# Patient Record
Sex: FEMALE | Race: WHITE | NOT HISPANIC OR LATINO | Employment: OTHER | URBAN - METROPOLITAN AREA
[De-identification: names, ages, dates, MRNs, and addresses within clinical notes are randomized per-mention and may not be internally consistent; named-entity substitution may affect disease eponyms.]

---

## 2024-02-13 ENCOUNTER — APPOINTMENT (OUTPATIENT)
Dept: CT IMAGING | Facility: HOSPITAL | Age: 74
End: 2024-02-13
Attending: EMERGENCY MEDICINE
Payer: COMMERCIAL

## 2024-02-13 ENCOUNTER — HOSPITAL ENCOUNTER (EMERGENCY)
Facility: HOSPITAL | Age: 74
Discharge: HOME | End: 2024-02-13
Attending: EMERGENCY MEDICINE | Admitting: EMERGENCY MEDICINE
Payer: COMMERCIAL

## 2024-02-13 VITALS
TEMPERATURE: 97.8 F | WEIGHT: 165 LBS | OXYGEN SATURATION: 98 % | RESPIRATION RATE: 16 BRPM | HEART RATE: 93 BPM | DIASTOLIC BLOOD PRESSURE: 96 MMHG | SYSTOLIC BLOOD PRESSURE: 175 MMHG

## 2024-02-13 DIAGNOSIS — R10.9 ABDOMINAL PAIN: ICD-10-CM

## 2024-02-13 DIAGNOSIS — R19.7 DIARRHEA: ICD-10-CM

## 2024-02-13 PROBLEM — N18.2 CKD (CHRONIC KIDNEY DISEASE) STAGE 2, GFR 60-89 ML/MIN: Status: ACTIVE | Noted: 2023-08-15

## 2024-02-13 PROBLEM — F41.8 DEPRESSION WITH ANXIETY: Chronic | Status: ACTIVE | Noted: 2022-06-23

## 2024-02-13 PROBLEM — I10 ESSENTIAL HYPERTENSION WITH GOAL BLOOD PRESSURE LESS THAN 140/90: Chronic | Status: ACTIVE | Noted: 2017-08-29

## 2024-02-13 LAB
ALBUMIN SERPL BCG-MCNC: 4.3 G/DL (ref 3.5–5.2)
ALP SERPL-CCNC: 107 U/L (ref 40–150)
ALT SERPL W P-5'-P-CCNC: 29 U/L (ref 0–50)
ANION GAP SERPL CALCULATED.3IONS-SCNC: 16 MMOL/L (ref 7–15)
AST SERPL W P-5'-P-CCNC: 34 U/L (ref 0–45)
BASOPHILS # BLD AUTO: 0.1 10E3/UL (ref 0–0.2)
BASOPHILS NFR BLD AUTO: 0 %
BILIRUB DIRECT SERPL-MCNC: <0.2 MG/DL (ref 0–0.3)
BILIRUB SERPL-MCNC: 0.5 MG/DL
BUN SERPL-MCNC: 12.9 MG/DL (ref 8–23)
CALCIUM SERPL-MCNC: 9.2 MG/DL (ref 8.8–10.2)
CHLORIDE SERPL-SCNC: 103 MMOL/L (ref 98–107)
CREAT SERPL-MCNC: 1.2 MG/DL (ref 0.51–0.95)
CRP SERPL-MCNC: 4.2 MG/L
DEPRECATED HCO3 PLAS-SCNC: 22 MMOL/L (ref 22–29)
EGFRCR SERPLBLD CKD-EPI 2021: 47 ML/MIN/1.73M2
EOSINOPHIL # BLD AUTO: 0.2 10E3/UL (ref 0–0.7)
EOSINOPHIL NFR BLD AUTO: 1 %
ERYTHROCYTE [DISTWIDTH] IN BLOOD BY AUTOMATED COUNT: 14.2 % (ref 10–15)
GLUCOSE SERPL-MCNC: 174 MG/DL (ref 70–99)
HCT VFR BLD AUTO: 47.6 % (ref 35–47)
HGB BLD-MCNC: 15.1 G/DL (ref 11.7–15.7)
HOLD SPECIMEN: NORMAL
HOLD SPECIMEN: NORMAL
IMM GRANULOCYTES # BLD: 0.1 10E3/UL
IMM GRANULOCYTES NFR BLD: 1 %
LACTATE SERPL-SCNC: 2.1 MMOL/L (ref 0.7–2)
LACTATE SERPL-SCNC: 2.7 MMOL/L (ref 0.7–2)
LIPASE SERPL-CCNC: 32 U/L (ref 13–60)
LYMPHOCYTES # BLD AUTO: 1.4 10E3/UL (ref 0.8–5.3)
LYMPHOCYTES NFR BLD AUTO: 7 %
MAGNESIUM SERPL-MCNC: 2.4 MG/DL (ref 1.7–2.3)
MCH RBC QN AUTO: 27.5 PG (ref 26.5–33)
MCHC RBC AUTO-ENTMCNC: 31.7 G/DL (ref 31.5–36.5)
MCV RBC AUTO: 87 FL (ref 78–100)
MONOCYTES # BLD AUTO: 0.9 10E3/UL (ref 0–1.3)
MONOCYTES NFR BLD AUTO: 4 %
NEUTROPHILS # BLD AUTO: 18.7 10E3/UL (ref 1.6–8.3)
NEUTROPHILS NFR BLD AUTO: 87 %
NRBC # BLD AUTO: 0 10E3/UL
NRBC BLD AUTO-RTO: 0 /100
PLATELET # BLD AUTO: 377 10E3/UL (ref 150–450)
POTASSIUM SERPL-SCNC: 3.7 MMOL/L (ref 3.4–5.3)
PROCALCITONIN SERPL IA-MCNC: 0.09 NG/ML
PROT SERPL-MCNC: 7.5 G/DL (ref 6.4–8.3)
RBC # BLD AUTO: 5.49 10E6/UL (ref 3.8–5.2)
SODIUM SERPL-SCNC: 141 MMOL/L (ref 135–145)
T4 FREE SERPL-MCNC: 1.65 NG/DL (ref 0.9–1.7)
TSH SERPL DL<=0.005 MIU/L-ACNC: 11.55 UIU/ML (ref 0.3–4.2)
WBC # BLD AUTO: 21.3 10E3/UL (ref 4–11)

## 2024-02-13 PROCEDURE — 85041 AUTOMATED RBC COUNT: CPT | Performed by: EMERGENCY MEDICINE

## 2024-02-13 PROCEDURE — 83605 ASSAY OF LACTIC ACID: CPT | Performed by: EMERGENCY MEDICINE

## 2024-02-13 PROCEDURE — 83690 ASSAY OF LIPASE: CPT | Performed by: EMERGENCY MEDICINE

## 2024-02-13 PROCEDURE — 74174 CTA ABD&PLVS W/CONTRAST: CPT

## 2024-02-13 PROCEDURE — 258N000003 HC RX IP 258 OP 636: Performed by: EMERGENCY MEDICINE

## 2024-02-13 PROCEDURE — 83735 ASSAY OF MAGNESIUM: CPT | Performed by: EMERGENCY MEDICINE

## 2024-02-13 PROCEDURE — 82248 BILIRUBIN DIRECT: CPT | Performed by: EMERGENCY MEDICINE

## 2024-02-13 PROCEDURE — 96360 HYDRATION IV INFUSION INIT: CPT | Mod: 59

## 2024-02-13 PROCEDURE — 250N000011 HC RX IP 250 OP 636: Performed by: EMERGENCY MEDICINE

## 2024-02-13 PROCEDURE — 36415 COLL VENOUS BLD VENIPUNCTURE: CPT | Performed by: EMERGENCY MEDICINE

## 2024-02-13 PROCEDURE — 84443 ASSAY THYROID STIM HORMONE: CPT | Performed by: EMERGENCY MEDICINE

## 2024-02-13 PROCEDURE — 84439 ASSAY OF FREE THYROXINE: CPT | Performed by: EMERGENCY MEDICINE

## 2024-02-13 PROCEDURE — 80053 COMPREHEN METABOLIC PANEL: CPT | Performed by: EMERGENCY MEDICINE

## 2024-02-13 PROCEDURE — 84145 PROCALCITONIN (PCT): CPT | Performed by: EMERGENCY MEDICINE

## 2024-02-13 PROCEDURE — 86140 C-REACTIVE PROTEIN: CPT | Performed by: EMERGENCY MEDICINE

## 2024-02-13 PROCEDURE — 99285 EMERGENCY DEPT VISIT HI MDM: CPT | Mod: 25

## 2024-02-13 RX ORDER — IOPAMIDOL 755 MG/ML
75 INJECTION, SOLUTION INTRAVASCULAR ONCE
Status: COMPLETED | OUTPATIENT
Start: 2024-02-13 | End: 2024-02-13

## 2024-02-13 RX ORDER — ONDANSETRON 4 MG/1
4 TABLET, ORALLY DISINTEGRATING ORAL EVERY 8 HOURS PRN
Qty: 20 TABLET | Refills: 0 | Status: SHIPPED | OUTPATIENT
Start: 2024-02-13

## 2024-02-13 RX ORDER — PANTOPRAZOLE SODIUM 40 MG/1
1 TABLET, DELAYED RELEASE ORAL 2 TIMES DAILY
COMMUNITY
Start: 2023-12-07

## 2024-02-13 RX ORDER — ACETAMINOPHEN 500 MG
500 TABLET ORAL
COMMUNITY

## 2024-02-13 RX ORDER — ALBUTEROL SULFATE 90 UG/1
2 AEROSOL, METERED RESPIRATORY (INHALATION)
COMMUNITY
Start: 2023-03-16

## 2024-02-13 RX ORDER — AMLODIPINE BESYLATE 5 MG/1
1 TABLET ORAL DAILY
COMMUNITY
Start: 2023-12-14

## 2024-02-13 RX ORDER — LEVOTHYROXINE SODIUM 100 UG/1
1 TABLET ORAL DAILY
COMMUNITY
Start: 2023-12-14

## 2024-02-13 RX ADMIN — SODIUM CHLORIDE 1122 ML: 9 INJECTION, SOLUTION INTRAVENOUS at 17:00

## 2024-02-13 RX ADMIN — IOPAMIDOL 75 ML: 755 INJECTION, SOLUTION INTRAVENOUS at 17:14

## 2024-02-13 ASSESSMENT — ACTIVITIES OF DAILY LIVING (ADL)
ADLS_ACUITY_SCORE: 35
ADLS_ACUITY_SCORE: 35

## 2024-02-13 NOTE — ED TRIAGE NOTES
Pt arrives via  EMS after experiencing a sudden onset of severe abdominal lower pain and uncontrolled diarrhea while she was eating lunch. The abdominal pain has mostly resolved. She says that she has had this happen before and it could be due to anxiety. In 2022, she was diagnosed for ischemic colitis.  Pt endorses some chills.      Triage Assessment (Adult)       Row Name 02/13/24 1456          Triage Assessment    Airway WDL WDL        Skin Circulation/Temperature WDL    Skin Circulation/Temperature WDL WDL        Cardiac WDL    Cardiac WDL WDL        Peripheral/Neurovascular WDL    Peripheral Neurovascular WDL WDL        Cognitive/Neuro/Behavioral WDL    Cognitive/Neuro/Behavioral WDL WDL

## 2024-02-13 NOTE — ED PROVIDER NOTES
Emergency Department Encounter   NAME: Britney Reeves ; AGE: 73 year old female ; YOB: 1950 ; MRN: [id number] ; PCP: Jhon Mejia   ED PROVIDER: Sury Cook PA-C    Evaluation Date & Time:   2/13/2024  3:57 PM    CHIEF COMPLAINT:  Abdominal Pain, Diarrhea, and Nausea      Impression and Plan   Medical Decision Making    Britney Reeves is a 73 year old female who presents for evaluation of severe left lower quadrant abdominal pain, diarrhea, nausea, vomiting.  Denies fevers, hematemesis, melena, hematochezia, urinary symptoms, recent travel, surgery, hospitalization.  Has history of similar symptoms with ischemic colitis back in 2022.  Vitals positive for mild hypertension but otherwise afebrile, not tachycardic/hypoxic/tachypneic.  On exam, mild tenderness palpation of the left lower quadrant without guarding or rebound.  Cardiopulmonary examination normal.    Differential diagnosis includes ischemic colitis, viral gastroenteritis/infectious colitis, gastritis, cholecystitis, choledocholithiasis, pyelonephritis, IBS, IBD.  Workup included lactic, TSH, magnesium, hepatic function panel, procalcitonin, CRP, BMP, CBC.  Lactic originally elevated at 2.7 and white blood cell count 21.3.  These findings could be consistent with ischemic colitis so CT a abdomen pelvis with contrast was ordered along with a bolus of fluids.  Repeat lactic after fluids came down to 2.1.  Patient reports no abdominal pain.  Rest of patient's labs are reassuring including procalcitonin 0.09, normal lipase, normal CRP, normal LFTs.  CTA abdomen pelvis showed findings consistent with colitis including liquid stool in the colon and some inflammation but no ischemic colitis or other findings that would be contributing to her symptoms.  Although lactate was elevated, patient is not septic appearing, normal vitals, benign exam so antibiotics were not started as a would not be indicated in colitis/viral gastroenteritis.   Patient reports minimal airplane/travel earlier last week so may have picked something up they are.  She has not been vomiting here at all.  She has had a few bouts of diarrhea but no melena/hematochezia.  Recommending Zofran for nausea and Imodium for diarrhea.  Follow-up with her primary care provider.  Did offer a stool sample here to test for viral/toxin type causes of diarrhea but declined.  No risk factors for C. difficile including recent antibiotic use, recent hospitalization, or history of C. difficile.  No urinary symptoms and known right upper quadrant type pain with normal LFTs.  I do think that this is just a viral gastroenteritis/colitis that we will run its course.  Recommended she follow-up with her primary care provider.    Patient was discharged in stable condition but instructed to return to the emergency department with any new or worsening of symptoms. Patient expressed understanding, feels comfortable, and is in agreement with this plan. All questions addressed prior to discharge.    Medical Decision Making  Obtained supplemental history:Supplemental history obtained?: No  Reviewed external records: External records reviewed?: No  Care impacted by chronic illness:N/A  Care significantly affected by social determinants of health:N/A  Did you consider but not order tests?: Work up considered but not performed and documented in chart, if applicable  Did you interpret images independently?: Independent interpretation of ECG and images noted in documentation, when applicable.  Consultation discussion with other provider:Did you involve another provider (consultant, , pharmacy, etc.)?: No  Discharge. I prescribed additional prescription strength medication(s) as charted. See documentation for any additional details.    0 minutes of critical care time    ED COURSE:  1600 I met and introduced myself to the patient. I gathered initial history and performed my physical exam. We discussed plan for  initial workup.   1630 I have staffed the patient with Dr. Dejesus, ED MD, who has evaluated the patient and agrees with all aspects of today's care.   1930 We discussed the plan for discharge and the patient is agreeable. Reviewed supportive cares, symptomatic treatment, outpatient follow up, and reasons to return to the Emergency Department. Patient to be discharged by ED RN.           FINAL IMPRESSION:    ICD-10-CM    1. Diarrhea  R19.7       2. Abdominal pain  R10.9     left lower          MEDICATIONS GIVEN IN THE EMERGENCY DEPARTMENT:  Medications   sodium chloride 0.9% BOLUS 1,122 mL (0 mLs Intravenous Stopped 2/13/24 1758)   iopamidol (ISOVUE-370) solution 75 mL (75 mLs Intravenous $Given 2/13/24 1714)       NEW PRESCRIPTIONS STARTED AT TODAY'S ED VISIT:  Discharge Medication List as of 2/13/2024  7:34 PM        START taking these medications    Details   ondansetron (ZOFRAN ODT) 4 MG ODT tab Take 1 tablet (4 mg) by mouth every 8 hours as needed for nausea or vomiting, Disp-20 tablet, R-0, Local Print             HPI   Patient information was obtained from: patient   Use of Intrepreter: N/A     Britney Reeves is a 73 year old female who presents for evaluation of severe left lower quadrant abdominal pain, diarrhea, nausea, vomiting that resolved by the time EMS brought her to the ER.  Occurred at about noon and she arrived here at 2.  denies fevers, hematemesis, melena, hematochezia, urinary symptoms, chest pain, shortness of breath, cough, LE swelling. Denies recent travel, surgery, hospitalization.  Has history of similar symptoms with ischemic colitis back in 2022.  Denies history of atrial fibrillation, blood clots, or other GI problems including IBS, inflammatory bowel disease, diverticulitis.  History of colonoscopies that have been normal and has deferred getting any more at this time..       Medical History     No past medical history on file.    No past surgical history on file.    No family history  on file.         albuterol (PROAIR HFA/PROVENTIL HFA/VENTOLIN HFA) 108 (90 Base) MCG/ACT inhaler  amLODIPine (NORVASC) 5 MG tablet  levothyroxine (SYNTHROID/LEVOTHROID) 100 MCG tablet  ondansetron (ZOFRAN ODT) 4 MG ODT tab  pantoprazole (PROTONIX) 40 MG EC tablet  acetaminophen (TYLENOL) 500 MG tablet        Physical Exam     First Vitals:  Patient Vitals for the past 24 hrs:   BP Temp Temp src Pulse Resp SpO2 Weight   02/13/24 1930 (!) 175/96 -- -- 93 16 98 % --   02/13/24 1740 (!) 185/81 -- -- 94 18 96 % --   02/13/24 1455 125/62 97.8  F (36.6  C) Oral 83 16 97 % 74.8 kg (165 lb)       PHYSICAL EXAM:   Physical Exam  Constitutional:       General: She is not in acute distress.     Appearance: She is well-developed. She is not ill-appearing.   HENT:      Head: Normocephalic and atraumatic.      Mouth/Throat:      Mouth: Mucous membranes are moist.   Cardiovascular:      Rate and Rhythm: Normal rate and regular rhythm.      Heart sounds: Normal heart sounds.   Pulmonary:      Effort: Pulmonary effort is normal. No respiratory distress.      Breath sounds: Normal breath sounds. No stridor. No wheezing.   Abdominal:      General: Abdomen is flat and protuberant. Bowel sounds are normal. There is no distension.      Palpations: Abdomen is soft.      Tenderness: There is abdominal tenderness in the left lower quadrant. There is no right CVA tenderness, left CVA tenderness, guarding or rebound.      Hernia: No hernia is present.      Comments: mild   Skin:     General: Skin is warm.      Capillary Refill: Capillary refill takes less than 2 seconds.   Neurological:      General: No focal deficit present.      Mental Status: She is alert.           Results     LAB:  All pertinent labs reviewed and interpreted  Labs Ordered and Resulted from Time of ED Arrival to Time of ED Departure   BASIC METABOLIC PANEL - Abnormal       Result Value    Sodium 141      Potassium 3.7      Chloride 103      Carbon Dioxide (CO2) 22       Anion Gap 16 (*)     Urea Nitrogen 12.9      Creatinine 1.20 (*)     GFR Estimate 47 (*)     Calcium 9.2      Glucose 174 (*)    LACTIC ACID WHOLE BLOOD - Abnormal    Lactic Acid 2.7 (*)    MAGNESIUM - Abnormal    Magnesium 2.4 (*)    TSH WITH FREE T4 REFLEX - Abnormal    TSH 11.55 (*)    CBC WITH PLATELETS AND DIFFERENTIAL - Abnormal    WBC Count 21.3 (*)     RBC Count 5.49 (*)     Hemoglobin 15.1      Hematocrit 47.6 (*)     MCV 87      MCH 27.5      MCHC 31.7      RDW 14.2      Platelet Count 377      % Neutrophils 87      % Lymphocytes 7      % Monocytes 4      % Eosinophils 1      % Basophils 0      % Immature Granulocytes 1      NRBCs per 100 WBC 0      Absolute Neutrophils 18.7 (*)     Absolute Lymphocytes 1.4      Absolute Monocytes 0.9      Absolute Eosinophils 0.2      Absolute Basophils 0.1      Absolute Immature Granulocytes 0.1      Absolute NRBCs 0.0     LACTIC ACID WHOLE BLOOD - Abnormal    Lactic Acid 2.1 (*)    HEPATIC FUNCTION PANEL - Normal    Protein Total 7.5      Albumin 4.3      Bilirubin Total 0.5      Alkaline Phosphatase 107      AST 34      ALT 29      Bilirubin Direct <0.20     LIPASE - Normal    Lipase 32     CRP INFLAMMATION - Normal    CRP Inflammation 4.20     PROCALCITONIN - Normal    Procalcitonin 0.09     T4 FREE - Normal    Free T4 1.65         RADIOLOGY:  CTA Abdomen Pelvis with Contrast   Final Result   IMPRESSION:   1.  Mild atherosclerotic disease abdominal aorta with plaque and slight narrowing at the origin of the celiac trunk. The splanchnic arteries otherwise are patent.    2.  Moderate fluid within the right colon and the sigmoid colon may represent a colitis and account for the patient's diarrhea. The distal transverse and descending colon are suboptimally assessed due to incomplete distention and may account for wall    thickening. Colitis also possible.   3.  Fatty liver.   4.  Esophageal hiatal hernia.            Sury Cook PA-C  Emergency Medicine   [facility]  [facility] EMERGENCY DEPARTMENT       Sury Cook PA-C  02/14/24 2786

## 2024-02-13 NOTE — ED PROVIDER NOTES
EMERGENCY DEPARTMENT ENCOUNTER      NAME: Britney Reeves  AGE: 73 year old female  YOB: 1950  MRN: [id number]  EVALUATION DATE & TIME: 2/13/2024  3:57 PM    PCP: No primary care provider on file.        Chief Complaint   Patient presents with    Abdominal Pain    Diarrhea    Nausea         IMPRESSION  1. Diarrhea    2. Abdominal pain        PLAN  - close PCP follow up  - discharge to home    ED COURSE & MEDICAL DECISION MAKING         5:19 PM  I was consulted by Sury Cook PA-C on this patient. I reviewed ED presentation history, assessment, management, plan to this point. Please see ED [facility] note for details.    Briefly, 73yoF with history of ischemic colitis presenting after episode of lower abdominal pain & diarrhea earlier today; no symptoms here in the ED. Unremarkable vitals on presentation and exam reassuring; no abdominal tenderness.    Labs with lactate 2.8 but otherwise unremarkable; CTA with colitis but no moderate/severe stenosis or occlusion----doubt cherelle ischemic colitis and more suspect lactate high due to diarrhea & pain episode. Lactate decreased to 2.1 on recheck after IVF; still asymptomatic on recheck and wants to go home. Patient able to tolerate PO and walk without difficulty. Return precautions and need for PCP follow up discussed and understood. No further questions at the time of discharge.        This patient involved a high degree of complexity in medical decision making, as significant risks were present and assessed. Recent encounters & results in medical record reviewed by me.    All workup (i.e. any EKG/labs/imaging as per charting below) reviewed and independently interpreted by me. See respective sections for details.        I had a face to face encounter with this patient seen by the Advanced Practice Provider ([facility]). I personally made/approved the management plan and take responsibility for the patient management. I personally saw patient and performed a  substantive portion of the visit including all aspects of the medical decision making.    MEDICATIONS GIVEN IN THE EMERGENCY DEPARTMENT  Medications   sodium chloride 0.9% BOLUS 1,122 mL (0 mLs Intravenous Stopped 2/13/24 7325)   iopamidol (ISOVUE-370) solution 75 mL (75 mLs Intravenous $Given 2/13/24 1713)       NEW PRESCRIPTIONS STARTED AT TODAY'S ER VISIT  Discharge Medication List as of 2/13/2024  7:34 PM        START taking these medications    Details   ondansetron (ZOFRAN ODT) 4 MG ODT tab Take 1 tablet (4 mg) by mouth every 8 hours as needed for nausea or vomiting, Disp-20 tablet, R-0, Local Print           CONTINUE these medications which have NOT CHANGED    Details   albuterol (PROAIR HFA/PROVENTIL HFA/VENTOLIN HFA) 108 (90 Base) MCG/ACT inhaler Inhale 2 puffs into the lungs, Historical      amLODIPine (NORVASC) 5 MG tablet Take 1 tablet by mouth daily, Historical      levothyroxine (SYNTHROID/LEVOTHROID) 100 MCG tablet Take 1 tablet by mouth daily, Historical      pantoprazole (PROTONIX) 40 MG EC tablet Take 1 tablet by mouth 2 times daily, Historical      acetaminophen (TYLENOL) 500 MG tablet Take 500 mg by mouth, Historical                 =================================================================      HPI  Britney Reeves is a 73 year old female with a pertinent history of ischemic colitis, CKD, hypothyroidism, HTN, HLD who presents to this ED for evaluation of abdominal pain & diarrhea. Was feeling fine earlier today, then this afternoon had episode of lower abdominal pain & diarrhea; no bloody stool. No vomiting. Symptoms resolved prior to ED; has no complaints at this time. Denies any history of C diff or recent antibiotic use.          --------------- MEDICAL HISTORY ---------------  PAST MEDICAL HISTORY:  Reviewed by me.  No past medical history on file.  Patient Active Problem List   Diagnosis    Acquired hypothyroidism    Allergic rhinitis    Asthma    CKD (chronic kidney disease) stage 2,  GFR 60-89 ml/min    Depression with anxiety    Dyslipidemia    Essential hypertension with goal blood pressure less than 140/90    Gastroesophageal reflux disease without esophagitis       PAST SURGICAL HISTORY:  Reviewed by me.  No past surgical history on file.    CURRENT MEDICATIONS:    Reviewed by me.  No current facility-administered medications for this encounter.    Current Outpatient Medications:     albuterol (PROAIR HFA/PROVENTIL HFA/VENTOLIN HFA) 108 (90 Base) MCG/ACT inhaler, Inhale 2 puffs into the lungs, Disp: , Rfl:     amLODIPine (NORVASC) 5 MG tablet, Take 1 tablet by mouth daily, Disp: , Rfl:     levothyroxine (SYNTHROID/LEVOTHROID) 100 MCG tablet, Take 1 tablet by mouth daily, Disp: , Rfl:     ondansetron (ZOFRAN ODT) 4 MG ODT tab, Take 1 tablet (4 mg) by mouth every 8 hours as needed for nausea or vomiting, Disp: 20 tablet, Rfl: 0    pantoprazole (PROTONIX) 40 MG EC tablet, Take 1 tablet by mouth 2 times daily, Disp: , Rfl:     acetaminophen (TYLENOL) 500 MG tablet, Take 500 mg by mouth, Disp: , Rfl:     ALLERGIES:  Reviewed by me.  Allergies   Allergen Reactions    Penicillins Nausea and Vomiting    Sulfa Antibiotics Rash     Oral mouth sores       FAMILY HISTORY:  Reviewed by me.  No family history on file.      SOCIAL HISTORY:   Reviewed by me.         --------------- PHYSICAL EXAM ---------------  Nursing notes and vitals independently reviewed by me.  VITALS:  Vitals:    02/13/24 1455 02/13/24 1740 02/13/24 1930   BP: 125/62 (!) 185/81 (!) 175/96   Pulse: 83 94 93   Resp: 16 18 16   Temp: 97.8  F (36.6  C)     TempSrc: Oral     SpO2: 97% 96% 98%   Weight: 74.8 kg (165 lb)         PHYSICAL EXAM:    General:  alert, interactive, no distress  Eyes:  conjunctivae clear, conjugate gaze  HENT:  atraumatic, nose with no rhinorrhea, oropharynx clear  Neck:  no meningismus  Cardiovascular:  HR 70s during exam, regular rhythm, no murmurs, brisk cap refill  Chest:  no chest wall tenderness  Pulmonary:   no stridor, normal phonation, normal work of breathing, clear lungs bilaterally  Abdomen: soft, nondistended, nontender  :  no CVA tenderness  Back:  no midline spinal tenderness  Musculoskeletal:  no pretibial edema, no calf tenderness. Gross ROM intact to joints of extremities with no obvious deformities.  Skin:  warm, dry, no rash  Neuro:  awake, alert, answers questions appropriately, follows commands, moves all limbs  Psych:  calm, normal affect      --------------- RESULTS ---------------    LAB:  Reviewed and independently interpreted by me.  Results for orders placed or performed during the hospital encounter of 02/13/24   CTA Abdomen Pelvis with Contrast    Impression    IMPRESSION:  1.  Mild atherosclerotic disease abdominal aorta with plaque and slight narrowing at the origin of the celiac trunk. The splanchnic arteries otherwise are patent.   2.  Moderate fluid within the right colon and the sigmoid colon may represent a colitis and account for the patient's diarrhea. The distal transverse and descending colon are suboptimally assessed due to incomplete distention and may account for wall   thickening. Colitis also possible.  3.  Fatty liver.  4.  Esophageal hiatal hernia.   Basic metabolic panel   Result Value Ref Range    Sodium 141 135 - 145 mmol/L    Potassium 3.7 3.4 - 5.3 mmol/L    Chloride 103 98 - 107 mmol/L    Carbon Dioxide (CO2) 22 22 - 29 mmol/L    Anion Gap 16 (H) 7 - 15 mmol/L    Urea Nitrogen 12.9 8.0 - 23.0 mg/dL    Creatinine 1.20 (H) 0.51 - 0.95 mg/dL    GFR Estimate 47 (L) >60 mL/min/1.73m2    Calcium 9.2 8.8 - 10.2 mg/dL    Glucose 174 (H) 70 - 99 mg/dL   Hepatic function panel   Result Value Ref Range    Protein Total 7.5 6.4 - 8.3 g/dL    Albumin 4.3 3.5 - 5.2 g/dL    Bilirubin Total 0.5 <=1.2 mg/dL    Alkaline Phosphatase 107 40 - 150 U/L    AST 34 0 - 45 U/L    ALT 29 0 - 50 U/L    Bilirubin Direct <0.20 0.00 - 0.30 mg/dL   Result Value Ref Range    Lipase 32 13 - 60 U/L    Lactic acid whole blood   Result Value Ref Range    Lactic Acid 2.7 (H) 0.7 - 2.0 mmol/L   Result Value Ref Range    Magnesium 2.4 (H) 1.7 - 2.3 mg/dL   TSH with free T4 reflex   Result Value Ref Range    TSH 11.55 (H) 0.30 - 4.20 uIU/mL   Result Value Ref Range    CRP Inflammation 4.20 <5.00 mg/L   CBC with platelets and differential   Result Value Ref Range    WBC Count 21.3 (H) 4.0 - 11.0 10e3/uL    RBC Count 5.49 (H) 3.80 - 5.20 10e6/uL    Hemoglobin 15.1 11.7 - 15.7 g/dL    Hematocrit 47.6 (H) 35.0 - 47.0 %    MCV 87 78 - 100 fL    MCH 27.5 26.5 - 33.0 pg    MCHC 31.7 31.5 - 36.5 g/dL    RDW 14.2 10.0 - 15.0 %    Platelet Count 377 150 - 450 10e3/uL    % Neutrophils 87 %    % Lymphocytes 7 %    % Monocytes 4 %    % Eosinophils 1 %    % Basophils 0 %    % Immature Granulocytes 1 %    NRBCs per 100 WBC 0 <1 /100    Absolute Neutrophils 18.7 (H) 1.6 - 8.3 10e3/uL    Absolute Lymphocytes 1.4 0.8 - 5.3 10e3/uL    Absolute Monocytes 0.9 0.0 - 1.3 10e3/uL    Absolute Eosinophils 0.2 0.0 - 0.7 10e3/uL    Absolute Basophils 0.1 0.0 - 0.2 10e3/uL    Absolute Immature Granulocytes 0.1 <=0.4 10e3/uL    Absolute NRBCs 0.0 10e3/uL   Extra Blue Top Tube   Result Value Ref Range    Hold Specimen JIC    Extra Red Top Tube   Result Value Ref Range    Hold Specimen JIC    Lactic acid whole blood   Result Value Ref Range    Lactic Acid 2.1 (H) 0.7 - 2.0 mmol/L   Result Value Ref Range    Procalcitonin 0.09 <0.50 ng/mL   Result Value Ref Range    Free T4 1.65 0.90 - 1.70 ng/dL       RADIOLOGY:  Reviewed and independently interpreted by me. Please see official radiology report.  Recent Results (from the past 24 hour(s))   CTA Abdomen Pelvis with Contrast    Narrative    EXAM: CTA ABDOMEN PELVIS WITH CONTRAST  LOCATION: [facility]  DATE: 2/13/2024    INDICATION: abdominal pain, diarrhea, high lactate  COMPARISON: None.  TECHNIQUE: CT angiogram abdomen pelvis during arterial phase of injection of IV  contrast. 2D and 3D MIP reconstructions were performed by the CT technologist. Dose reduction techniques were used.  CONTRAST: isovue 370 75ml    FINDINGS:  ANGIOGRAM ABDOMEN/PELVIS: The abdominal aorta is negative for dissection or aneurysm. Moderate atherosclerotic disease. Plaque and mild narrowing at the origin of the celiac trunk. The inferior mesenteric artery inferior mesenteric artery, both renal   arteries are patent without significant narrowing.    LOWER CHEST: Minimal atelectasis or scarring both lower lung fields. Small esophageal hiatal hernia.    HEPATOBILIARY: Diffuse fatty infiltration of the liver. Tiny hypodensity near the liver dome too small to further characterize but likely represents a cyst. Cholecystectomy.    PANCREAS: Normal.    SPLEEN: Normal.    ADRENAL GLANDS: Normal.    KIDNEYS/BLADDER: Cortical scarring upper pole right kidney. No renal calculi or hydronephrosis. Tiny right renal cyst.    BOWEL: Moderate fluid right colon and in the sigmoid colon. The distal transverse and descending colon are nondistended and may account for mild wall thickening. No evidence for obstruction. No pneumatosis. Small duodenal diverticulum.    LYMPH NODES: Normal.    PELVIC ORGANS: Hysterectomy. No ascites.    MUSCULOSKELETAL: Degenerative disc disease L5 level.      Impression    IMPRESSION:  1.  Mild atherosclerotic disease abdominal aorta with plaque and slight narrowing at the origin of the celiac trunk. The splanchnic arteries otherwise are patent.   2.  Moderate fluid within the right colon and the sigmoid colon may represent a colitis and account for the patient's diarrhea. The distal transverse and descending colon are suboptimally assessed due to incomplete distention and may account for wall   thickening. Colitis also possible.  3.  Fatty liver.  4.  Esophageal hiatal hernia.                   --------------- ADDITIONAL MDM ---------------  History:  - I considered systemic symptoms of the  presenting illness.  - Supplemental history from:       -- patient  - External Record(s) reviewed:       -- Inpatient/outpatient record, prior labs, prior imaging       -- see above ED course & MDM for further details    Workup:  - Chart documentation above includes differential considered and any EKGs or imaging independently interpreted by provider.  - In additional to work up documented, I considered the following work up:       -- blood cultures       -- see above ED course & MDM for further details    External Consultation:  - Discussion of management with another provider:       -- ED pharmacist re: meds       -- see above charting for additional    Complicating Factors:  - Care impacted by chronic illness:       -- see above MDM, past medical history, & problem list  - Care affected by social determinants of health:       -- see above social history       -- Access to Affordable Healthcare    Disposition Considerations:  - Discharge       -- I considered escalation of care with admission to the hospital, but ultimately discharged the patient per decision making above       -- I recommended the patient continue their current prescription strength medication(s) as charted above in current medications list       -- I prescribed prescription strength medication(s) as charted above       -- I recommended over-the-counter medication(s) as charted above & in discharge instructions           I, Jose Sorto, am serving as a scribe to document services personally performed by Dr. Garrick Dejesus based on my observation and the provider's statements to me. I, Garrick Dejesus MD attest that oJse Sorto is acting in a scribe capacity, has observed my performance of the services and has documented them in accordance with my direction.      Garrick Dejesus MD  02/13/24  Emergency Medicine  [facility] EMERGENCY DEPARTMENT  [address] [place] [id number] [phone]  Dept:  [phone]       Garrick Dejesus MD  02/13/24 7469

## 2024-02-13 NOTE — ED NOTES
Patient up to the bedside commode several times independently without complaint having diarrhea.

## 2024-02-14 NOTE — DISCHARGE INSTRUCTIONS
You are seen in the emergency department today for diarrhea, abdominal pain, nausea/vomiting.  We did a lab workup here which showed elevated white blood cell count and lactic acid so we were concerned about ischemic colitis given your history.  We did a CT did show colitis, which would explain your diarrhea likely from a viral infection or exposure to something such as food poisoning etc. but not consistent with ischemic colitis.  You are well-appearing, vitals are reassuring.  I will send you home with a prescription for Zofran that can help with the nausea.  I would follow-up with your primary care provider.  Monitor your symptoms including worsening abdominal pain, vomiting, blood in your stool or vomit, or chest pain you would want to be reevaluated in the ER.  You can also take Imodium over the next day or 2 to help slow the diarrhea amount.